# Patient Record
Sex: MALE | URBAN - METROPOLITAN AREA
[De-identification: names, ages, dates, MRNs, and addresses within clinical notes are randomized per-mention and may not be internally consistent; named-entity substitution may affect disease eponyms.]

---

## 2021-03-28 ENCOUNTER — HOSPITAL ENCOUNTER (EMERGENCY)
Facility: HOSPITAL | Age: 36
Discharge: HOME | End: 2021-03-28
Attending: EMERGENCY MEDICINE

## 2021-03-28 VITALS
TEMPERATURE: 98.2 F | RESPIRATION RATE: 16 BRPM | HEART RATE: 92 BPM | OXYGEN SATURATION: 98 % | HEIGHT: 68 IN | DIASTOLIC BLOOD PRESSURE: 93 MMHG | SYSTOLIC BLOOD PRESSURE: 142 MMHG | WEIGHT: 150 LBS | BODY MASS INDEX: 22.73 KG/M2

## 2021-03-28 DIAGNOSIS — F11.10 HEROIN ABUSE (HCC): Primary | ICD-10-CM

## 2021-03-28 PROCEDURE — 99282 EMERGENCY DEPT VISIT SF MDM: CPT | Performed by: EMERGENCY MEDICINE

## 2021-03-28 PROCEDURE — 99282 EMERGENCY DEPT VISIT SF MDM: CPT

## 2021-03-28 NOTE — ED PROVIDER NOTES
History  Chief Complaint   Patient presents with    Detox Evaluation     Pt was supposed to go to the [facility] and was accidentally taken to [facility] and they did not have a bed available  Here asking for a ride to appropriate facility  History provided by:  Patient   used: No    Detox Evaluation  Associated symptoms: no abdominal pain, no headaches, no nausea, no palpitations, no shortness of breath, no vomiting and no weakness      Patient is a 71-year-old male presenting to emergency department due to being taken to the wrong detox facility by mistake  Patient was supposed to be taken to [facility] for heroin detox but the  by mistake took him to [facility]  There are no beds at [facility]  They called an ambulance and brought him to ER  Patient has no complaints  Wants to be transferred to his detox facility  MDM will contact [facility]      None       Past Medical History:   Diagnosis Date    [facility]     Heroin abuse ([facility] )        History reviewed  No pertinent surgical history  History reviewed  No pertinent family history  I have reviewed and agree with the history as documented  E-Cigarette/Vaping    E-Cigarette Use Never User      E-Cigarette/Vaping Substances    Nicotine No     THC No     CBD No     Flavoring No     Other No     Unknown No      Social History     Tobacco Use    Smoking status: Not on file   Substance Use Topics    Alcohol use: Not Currently    Drug use: Yes     Types: Amphetamines, Heroin       Review of Systems   Constitutional: Negative for chills, diaphoresis and fever  HENT: Negative for congestion and sore throat  Respiratory: Negative for cough, shortness of breath, wheezing and stridor  Cardiovascular: Negative for chest pain, palpitations and leg swelling  Gastrointestinal: Negative for abdominal pain, blood in stool, diarrhea, nausea and vomiting  Genitourinary: Negative for dysuria, frequency and urgency  Musculoskeletal: Negative for neck pain and neck stiffness  Skin: Negative for pallor and rash  Neurological: Negative for dizziness, syncope, weakness, light-headedness and headaches  All other systems reviewed and are negative  Physical Exam  Physical Exam  Vitals signs reviewed  Constitutional:       Appearance: He is well-developed  HENT:      Head: Normocephalic and atraumatic  Eyes:      Extraocular Movements: Extraocular movements intact  Pupils: Pupils are equal, round, and reactive to light  Neck:      Musculoskeletal: Normal range of motion and neck supple  Cardiovascular:      Rate and Rhythm: Normal rate and regular rhythm  Pulmonary:      Effort: Pulmonary effort is normal  No respiratory distress  Abdominal:      General: Bowel sounds are normal       Palpations: Abdomen is soft  Tenderness: There is no abdominal tenderness  Musculoskeletal: Normal range of motion  General: No swelling or tenderness  Skin:     General: Skin is warm and dry  Capillary Refill: Capillary refill takes less than 2 seconds  Neurological:      General: No focal deficit present  Mental Status: He is alert and oriented to person, place, and time  Vital Signs  ED Triage Vitals [03/28/21 1534]   Temperature Pulse Respirations Blood Pressure SpO2   98 2 °F (36 8 °C) 92 16 142/93 98 %      Temp Source Heart Rate Source Patient Position - Orthostatic VS BP Location FiO2 (%)   Temporal Monitor Lying Right arm --      Pain Score       --           Vitals:    03/28/21 1534   BP: 142/93   Pulse: 92   Patient Position - Orthostatic VS: Lying         Visual Acuity      ED Medications  Medications - No data to display    Diagnostic Studies  Results Reviewed     None                 No orders to display              Procedures  Procedures         ED Course      patient with bed at [facility]    Will send patient there for admission for detox                        SBIRT 20yo+      Most Recent Value   SBIRT (22 yo +)   In order to provide better care to our patients, we are screening all of our patients for alcohol and drug use  Would it be okay to ask you these screening questions? Yes Filed at: 03/28/2021 1538   Initial Alcohol Screen: US AUDIT-C    1  How often do you have a drink containing alcohol?  0 Filed at: 03/28/2021 1538   2  How many drinks containing alcohol do you have on a typical day you are drinking? 0 Filed at: 03/28/2021 1538   3a  Male UNDER 65: How often do you have five or more drinks on one occasion? 0 Filed at: 03/28/2021 1538   3b  FEMALE Any Age, or MALE 65+: How often do you have 4 or more drinks on one occassion? 0 Filed at: 03/28/2021 1538   Audit-C Score  0 Filed at: 03/28/2021 1538   DENYS: How many times in the past year have you    Used an illegal drug or used a prescription medication for non-medical reasons? Never Filed at: 03/28/2021 1538                    MDM    Disposition  Final diagnoses:   Heroin abuse ([facility] )     Time reflects when diagnosis was documented in both MDM as applicable and the Disposition within this note     Time User Action Codes Description Comment    3/28/2021  5:30 PM Mary Weinberg Add [F19 10] Polysubstance abuse ([facility] )     3/28/2021  5:30 PM Mary Weinberg Remove [F19 10] Polysubstance abuse ([facility] )     3/28/2021  5:30 PM Mary Weinberg Add [F11 10] Heroin abuse [facility])       ED Disposition     ED Disposition Condition Date/Time Comment    Discharge Stable Sun Mar 28, 2021  5:30 PM Jose G Phillips discharge to home/self care              Follow-up Information     Follow up With Specialties Details Why Contact Info Additional Information     [address] Emergency Department Emergency Medicine  As needed, If symptoms worsen [address] [place],[address] [id number]  [address] [facility] Emergency Department, [address] [place] [address], [address]    [facility]   Please go directly            There are no discharge medications for this patient  No discharge procedures on file      PDMP Review     None          ED Provider  Electronically Signed by           Katerin Barclay MD  03/28/21 1109

## 2021-03-28 NOTE — ED NOTES
Hannah Gonzalez called back, will reach out to [facility] in [place] to see if pts bed is still available        Sukhwinder Parada RN  03/28/21 9337